# Patient Record
Sex: FEMALE | Race: WHITE | NOT HISPANIC OR LATINO
[De-identification: names, ages, dates, MRNs, and addresses within clinical notes are randomized per-mention and may not be internally consistent; named-entity substitution may affect disease eponyms.]

---

## 2018-06-22 ENCOUNTER — RX ONLY (RX ONLY)
Age: 33
End: 2018-06-22

## 2018-06-22 ENCOUNTER — WORRISOME GROWTH - SEE NOTE (OUTPATIENT)
Dept: URBAN - METROPOLITAN AREA CLINIC 36 | Facility: CLINIC | Age: 33
Setting detail: DERMATOLOGY
End: 2018-06-22

## 2018-06-22 PROCEDURE — 99213 OFFICE O/P EST LOW 20 MIN: CPT

## 2018-06-22 RX ORDER — TRIAMCINOLONE ACETONIDE 1 MG/G
1 APPLICATION CREAM TOPICAL BID
Qty: 30 | Refills: 0 | Status: DISCONTINUED
Start: 2018-06-22 | End: 2019-08-29

## 2018-08-03 ENCOUNTER — SKIN CANCER EXAM (OUTPATIENT)
Dept: URBAN - METROPOLITAN AREA CLINIC 36 | Facility: CLINIC | Age: 33
Setting detail: DERMATOLOGY
End: 2018-08-03

## 2018-08-03 PROCEDURE — 11301 SHAVE SKIN LESION 0.6-1.0 CM: CPT

## 2018-08-03 PROCEDURE — 99214 OFFICE O/P EST MOD 30 MIN: CPT

## 2018-08-03 PROCEDURE — 11306 SHAVE SKIN LESION 0.6-1.0 CM: CPT

## 2019-08-29 ENCOUNTER — SKIN CANCER EXAM (OUTPATIENT)
Dept: URBAN - METROPOLITAN AREA CLINIC 36 | Facility: CLINIC | Age: 34
Setting detail: DERMATOLOGY
End: 2019-08-29

## 2019-08-29 DIAGNOSIS — L85.3 XEROSIS CUTIS: ICD-10-CM

## 2019-08-29 PROCEDURE — 99214 OFFICE O/P EST MOD 30 MIN: CPT

## 2019-08-29 PROCEDURE — 11301 SHAVE SKIN LESION 0.6-1.0 CM: CPT

## 2019-09-04 ENCOUNTER — FACIAL- EST (OUTPATIENT)
Dept: URBAN - METROPOLITAN AREA CLINIC 36 | Facility: CLINIC | Age: 34
Setting detail: DERMATOLOGY
End: 2019-09-04

## 2019-09-12 ENCOUNTER — FOLLOW UP (OUTPATIENT)
Dept: URBAN - METROPOLITAN AREA CLINIC 36 | Facility: CLINIC | Age: 34
Setting detail: DERMATOLOGY
End: 2019-09-12

## 2019-09-12 DIAGNOSIS — Z41.9 ENCOUNTER FOR PROCEDURE FOR PURPOSES OTHER THAN REMEDYING HEALTH STATE, UNSPECIFIED: ICD-10-CM

## 2019-09-12 PROCEDURE — 99213 OFFICE O/P EST LOW 20 MIN: CPT

## 2019-09-12 PROCEDURE — 11301 SHAVE SKIN LESION 0.6-1.0 CM: CPT

## 2020-03-04 ENCOUNTER — SKIN CANCER EXAM (OUTPATIENT)
Dept: URBAN - METROPOLITAN AREA CLINIC 36 | Facility: CLINIC | Age: 35
Setting detail: DERMATOLOGY
End: 2020-03-04

## 2020-03-04 ENCOUNTER — RX ONLY (RX ONLY)
Age: 35
End: 2020-03-04

## 2020-03-04 PROBLEM — D18.01 HEMANGIOMA OF SKIN AND SUBCUTANEOUS TISSUE: Status: RESOLVED | Noted: 2020-03-04

## 2020-03-04 PROBLEM — D18.01 HEMANGIOMA OF SKIN AND SUBCUTANEOUS TISSUE: Status: ACTIVE | Noted: 2020-03-04

## 2020-03-04 PROCEDURE — OTHER DYSPORT VIAL #: OTHER

## 2020-03-04 PROCEDURE — 11400 EXC TR-EXT B9+MARG 0.5 CM<: CPT

## 2020-03-04 PROCEDURE — 99214 OFFICE O/P EST MOD 30 MIN: CPT

## 2020-03-04 PROCEDURE — OTHER DYSPORT - 2 AREA: OTHER

## 2020-03-04 PROCEDURE — 12031 INTMD RPR S/A/T/EXT 2.5 CM/<: CPT

## 2020-03-04 RX ORDER — TRETINOIN 0.5 MG/G
1 APPLICATION LOTION TOPICAL NIGHTLY
Qty: 45 | Refills: 3
Start: 2020-03-04

## 2020-03-11 ENCOUNTER — *BOTOX/DYSPORT (OUTPATIENT)
Dept: URBAN - METROPOLITAN AREA CLINIC 36 | Facility: CLINIC | Age: 35
Setting detail: DERMATOLOGY
End: 2020-03-11

## 2020-03-11 DIAGNOSIS — L73.2 HIDRADENITIS SUPPURATIVA: ICD-10-CM

## 2020-03-11 DIAGNOSIS — D22.5 MELANOCYTIC NEVI OF TRUNK: ICD-10-CM

## 2020-03-11 DIAGNOSIS — L85.8 OTHER SPECIFIED EPIDERMAL THICKENING: ICD-10-CM

## 2020-03-11 PROCEDURE — OTHER DYSPORT - TOUCH-UP: OTHER

## 2020-03-11 PROCEDURE — OTHER DYSPORT VIAL #: OTHER

## 2020-12-17 ENCOUNTER — SKIN CANCER EXAM (OUTPATIENT)
Dept: URBAN - METROPOLITAN AREA CLINIC 36 | Facility: CLINIC | Age: 35
Setting detail: DERMATOLOGY
End: 2020-12-17

## 2020-12-17 ENCOUNTER — RX ONLY (RX ONLY)
Age: 35
End: 2020-12-17

## 2020-12-17 PROBLEM — L71.0 PERIORAL DERMATITIS: Status: ACTIVE | Noted: 2020-12-17

## 2020-12-17 PROBLEM — D18.01 HEMANGIOMA OF SKIN AND SUBCUTANEOUS TISSUE: Status: ACTIVE | Noted: 2020-12-17

## 2020-12-17 PROBLEM — L71.0 PERIORAL DERMATITIS: Status: RESOLVED | Noted: 2020-12-17

## 2020-12-17 PROBLEM — D18.01 HEMANGIOMA OF SKIN AND SUBCUTANEOUS TISSUE: Status: RESOLVED | Noted: 2020-12-17

## 2020-12-17 PROCEDURE — 99214 OFFICE O/P EST MOD 30 MIN: CPT

## 2020-12-17 PROCEDURE — 11300 SHAVE SKIN LESION 0.5 CM/<: CPT

## 2020-12-17 RX ORDER — DOXYCYCLINE HYCLATE 20 MG/1
1 TABLET TABLET, FILM COATED ORAL BID
Qty: 60 | Refills: 2 | Status: DISCONTINUED
Start: 2020-12-17 | End: 2021-02-11

## 2021-02-11 ENCOUNTER — RX ONLY (RX ONLY)
Age: 36
End: 2021-02-11

## 2021-02-11 RX ORDER — DOXYCYCLINE HYCLATE 20 MG/1
1 TABLET TABLET, FILM COATED ORAL BID
Qty: 180 | Refills: 0
Start: 2021-02-11

## 2021-03-01 ENCOUNTER — ACNE/ROSACEA (OUTPATIENT)
Dept: URBAN - METROPOLITAN AREA CLINIC 36 | Facility: CLINIC | Age: 36
Setting detail: DERMATOLOGY
End: 2021-03-01

## 2021-03-01 DIAGNOSIS — L56.8 OTHER SPECIFIED ACUTE SKIN CHANGES DUE TO ULTRAVIOLET RADIATION: ICD-10-CM

## 2021-03-01 DIAGNOSIS — B35.1 TINEA UNGUIUM: ICD-10-CM

## 2021-03-01 PROCEDURE — 99214 OFFICE O/P EST MOD 30 MIN: CPT

## 2023-04-20 ENCOUNTER — APPOINTMENT (OUTPATIENT)
Dept: URBAN - METROPOLITAN AREA CLINIC 207 | Age: 38
Setting detail: DERMATOLOGY
End: 2023-04-20

## 2023-04-20 DIAGNOSIS — B36.0 PITYRIASIS VERSICOLOR: ICD-10-CM

## 2023-04-20 DIAGNOSIS — L70.0 ACNE VULGARIS: ICD-10-CM

## 2023-04-20 DIAGNOSIS — R21 RASH AND OTHER NONSPECIFIC SKIN ERUPTION: ICD-10-CM

## 2023-04-20 PROCEDURE — OTHER COUNSELING: OTHER

## 2023-04-20 PROCEDURE — OTHER PRESCRIPTION MEDICATION MANAGEMENT: OTHER

## 2023-04-20 PROCEDURE — OTHER MIPS QUALITY: OTHER

## 2023-04-20 PROCEDURE — OTHER PRESCRIPTION: OTHER

## 2023-04-20 PROCEDURE — 99214 OFFICE O/P EST MOD 30 MIN: CPT

## 2023-04-20 RX ORDER — KETOCONAZOLE 20 MG/G
CREAM TOPICAL BID
Qty: 60 | Refills: 0 | Status: ERX | COMMUNITY
Start: 2023-04-20

## 2023-04-20 RX ORDER — TAZAROTENE 0.45 MG/G
LOTION TOPICAL
Qty: 45 | Refills: 4 | Status: ERX | COMMUNITY
Start: 2023-04-20

## 2023-04-20 ASSESSMENT — LOCATION DETAILED DESCRIPTION DERM
LOCATION DETAILED: RIGHT INFRAMAMMARY CREASE (INNER QUADRANT)
LOCATION DETAILED: LEFT MEDIAL BREAST 8-9:00 REGION
LOCATION DETAILED: LEFT CENTRAL MALAR CHEEK
LOCATION DETAILED: LEFT RIB CAGE
LOCATION DETAILED: LEFT LATERAL ABDOMEN
LOCATION DETAILED: RIGHT INFERIOR UPPER BACK
LOCATION DETAILED: RIGHT RIB CAGE

## 2023-04-20 ASSESSMENT — LOCATION SIMPLE DESCRIPTION DERM
LOCATION SIMPLE: LEFT CHEEK
LOCATION SIMPLE: ABDOMEN
LOCATION SIMPLE: RIGHT BREAST
LOCATION SIMPLE: LEFT BREAST
LOCATION SIMPLE: RIGHT UPPER BACK

## 2023-04-20 ASSESSMENT — LOCATION ZONE DERM
LOCATION ZONE: TRUNK
LOCATION ZONE: FACE

## 2023-04-20 NOTE — PROCEDURE: COUNSELING
Birth Control Pills Counseling: Birth Control Pill Counseling: I discussed with the patient the potential side effects of OCPs including but not limited to increased risk of stroke, heart attack, thrombophlebitis, deep venous thrombosis, hepatic adenomas, breast changes, GI upset, headaches, changes in libido,  and depression. There is a slight increase risk of breast cancer by 7% overall,( 20-30% higher while taking BCPs, and returning to 0% increased risk within 5 years.) ref: [facility] breast cancer site.\\n\\n Advantages of BCPs include regulating periods and menstrual flow, can make periods lighter, and decrease the discomfort of menstrual cramps, decrease risk of osteoporosis, anemia, vaginal dryness, and body hair.  They can decrease the risk of ovarian cancer by 27% and endometrial cancer by 50%. The patient verbalized understanding of the proper use and possible adverse effects of OCPs. All of the patient's questions and concerns were addressed.\\n\\n\\nBcp vs antibiotics:\\Alexa brand of birth control pills and the Nuvaring improve acne after 4 months as effectively as antibiotics per studies in [facility]. In this age of multi drug resistant bacteria, dermatologists are encouraged to discontinue antibiotics after 3-4 months.  Spironolactone, a hormone receptor blocker is another alternative to both therapies,  There are both medical advantages and side effects of birth control pills that need to be evaluated with your health care provider. Contraindications including women who smoke, with histories of blood clots , migraines, estrogen positive breast cancer or other medical conditions. Age greater than 35 increases risk of all the above risks Birth Control Pills Counseling: Birth Control Pill Counseling: I discussed with the patient the potential side effects of OCPs including but not limited to increased risk of stroke, heart attack, thrombophlebitis, deep venous thrombosis, hepatic adenomas, breast changes, GI upset, headaches, changes in libido,  and depression. There is a slight increase risk of breast cancer by 7% overall,( 20-30% higher while taking BCPs, and returning to 0% increased risk within 5 years.) ref: [facility]na Gove[facility] breast cancer site.\\n\\n Advantages of BCPs include regulating periods and menstrual flow, can make periods lighter, and decrease the discomfort of menstrual cramps, decrease risk of osteoporosis, anemia, vaginal dryness, and body hair.  They can decrease the risk of ovarian cancer by 27% and endometrial cancer by 50%. The patient verbalized understanding of the proper use and possible adverse effects of OCPs. All of the patient's questions and concerns were addressed.\\n\\n\\nBcp vs antibiotics:\\Alexa brand of birth control pills and the Nuvaring improve acne after 4 months as effectively as antibiotics per studies in [facility]. In this age of multi drug resistant bacteria, dermatologists are encouraged to discontinue antibiotics after 3-4 months.  Spironolactone, a hormone receptor blocker is another alternative to both therapies,  There are both medical advantages and side effects of birth control pills that need to be evaluated with your health care provider. Contraindications including women who smoke, with histories of blood clots , migraines, estrogen positive breast cancer or other medical conditions. Age greater than 35 increases risk of all the above risks

## 2023-04-20 NOTE — PROCEDURE: PRESCRIPTION MEDICATION MANAGEMENT
Plan: Well controlled on topical\\nRefills sent/good for 1 year\\n\\nRx: Arazlo lotion - apply pea sized amount on face nightly as tolerable \\n\\nContinue using noncomedogenic products such as CeraVe or Cetaphil Gentle\\nCombat dryness from retinoid by applying gentle moisturizer before and/or after applications\\nSunscreen SPF 30+ daily\\nDiscontinue if become pregnant

## 2023-04-20 NOTE — PROCEDURE: PRESCRIPTION MEDICATION MANAGEMENT
Plan: TV vs. CO\\nDiscussed nature of diagnoses in detail\\n\\nPatient advised if rash is unresponsive to topical, suspect CO. Dermnet print out on CO provided\\nInformed patient CO is benign and not contagious. Advised duration of rash is typically 3 months and can continue to spread to different areas of body\\n\\nDiscussed procedure of bx'ing if not resolved at upcoming SCE in July\\nRx: Ketoconazole 2% cream - apply to affected areas twice a day for 2 weeks as needed \\n\\nInformed patient PIH may occur secondarily to rash and will resolve in time\\nSunscreen SPF 30+ daily and sun protective clothing Plan: TV vs. RI\\nDiscussed nature of diagnoses in detail\\n\\nPatient advised if rash is unresponsive to topical, suspect RI. Dermnet print out on RI provided\\nInformed patient RI is benign and not contagious. Advised duration of rash is typically 3 months and can continue to spread to different areas of body\\n\\nDiscussed procedure of bx'ing if not resolved at upcoming SCE in July\\nRx: Ketoconazole 2% cream - apply to affected areas twice a day for 2 weeks as needed \\n\\nInformed patient PIH may occur secondarily to rash and will resolve in time\\nSunscreen SPF 30+ daily and sun protective clothing

## 2023-07-05 ENCOUNTER — APPOINTMENT (OUTPATIENT)
Dept: URBAN - METROPOLITAN AREA CLINIC 207 | Age: 38
Setting detail: DERMATOLOGY
End: 2023-07-05
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
## 2023-07-05 DIAGNOSIS — L57.8 OTHER SKIN CHANGES DUE TO CHRONIC EXPOSURE TO NONIONIZING RADIATION: ICD-10-CM

## 2023-07-05 DIAGNOSIS — Z85.820 PERSONAL HISTORY OF MALIGNANT MELANOMA OF SKIN: ICD-10-CM

## 2023-07-05 DIAGNOSIS — Z41.9 ENCOUNTER FOR PROCEDURE FOR PURPOSES OTHER THAN REMEDYING HEALTH STATE, UNSPECIFIED: ICD-10-CM

## 2023-07-05 DIAGNOSIS — D22 MELANOCYTIC NEVI: ICD-10-CM

## 2023-07-05 DIAGNOSIS — L82.1 OTHER SEBORRHEIC KERATOSIS: ICD-10-CM

## 2023-07-05 DIAGNOSIS — D18.0 HEMANGIOMA: ICD-10-CM
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
## 2023-07-05 PROBLEM — D18.01 HEMANGIOMA OF SKIN AND SUBCUTANEOUS TISSUE: Status: ACTIVE | Noted: 2023-07-05

## 2023-07-05 PROBLEM — D22.9 MELANOCYTIC NEVI, UNSPECIFIED: Status: ACTIVE | Noted: 2023-07-05

## 2023-07-05 PROCEDURE — OTHER COUNSELING: OTHER

## 2023-07-05 PROCEDURE — OTHER INVENTORY: OTHER

## 2023-07-05 PROCEDURE — 99213 OFFICE O/P EST LOW 20 MIN: CPT

## 2023-07-05 PROCEDURE — OTHER PATIENT SPECIFIC COUNSELING: OTHER

## 2023-07-05 PROCEDURE — OTHER MIPS QUALITY: OTHER

## 2023-07-05 PROCEDURE — OTHER DYSPORT: OTHER

## 2023-07-05 ASSESSMENT — LOCATION DETAILED DESCRIPTION DERM
LOCATION DETAILED: RIGHT SUPERIOR LATERAL MIDBACK
LOCATION DETAILED: RIGHT SUPERIOR UPPER BACK
LOCATION DETAILED: UPPER STERNUM
LOCATION DETAILED: LEFT LATERAL SUPERIOR CHEST

## 2023-07-05 ASSESSMENT — LOCATION SIMPLE DESCRIPTION DERM
LOCATION SIMPLE: RIGHT LOWER BACK
LOCATION SIMPLE: CHEST
LOCATION SIMPLE: RIGHT UPPER BACK

## 2023-07-05 ASSESSMENT — LOCATION ZONE DERM: LOCATION ZONE: TRUNK

## 2023-07-05 NOTE — PROCEDURE: COUNSELING
Detail Level: Generalized
Quality 137: Melanoma: Continuity Of Care - Recall System: Patient information entered into a recall system that includes: target date for the next exam specified AND a process to follow up with patients regarding missed or unscheduled appointments
Detail Level: Zone
When Should The Patient Follow-Up For Their Next Full-Body Skin Exam?: 1 Year

## 2023-07-05 NOTE — PROCEDURE: MIPS QUALITY
Quality 226: Preventive Care And Screening: Tobacco Use: Screening And Cessation Intervention: Patient screened for tobacco use and is an ex/non-smoker
Quality 138: Melanoma: Coordination Of Care: Treatment plan not communicated, reason not otherwise specified.
Detail Level: Detailed
Quality 137: Melanoma: Continuity Of Care - Recall System: Patient information entered into a recall system that includes: target date for the next exam specified AND a process to follow up with patients regarding missed or unscheduled appointments

## 2023-07-05 NOTE — PROCEDURE: DYSPORT
Price (Use Numbers Only, No Special Characters Or $): 257 Price (Use Numbers Only, No Special Characters Or $): 771

## 2023-07-12 ENCOUNTER — APPOINTMENT (OUTPATIENT)
Dept: URBAN - METROPOLITAN AREA CLINIC 207 | Age: 38
Setting detail: DERMATOLOGY
End: 2023-07-12

## 2023-07-12 DIAGNOSIS — Z41.9 ENCOUNTER FOR PROCEDURE FOR PURPOSES OTHER THAN REMEDYING HEALTH STATE, UNSPECIFIED: ICD-10-CM

## 2023-07-12 PROCEDURE — OTHER INVENTORY: OTHER

## 2023-07-12 PROCEDURE — OTHER DYSPORT: OTHER

## 2023-07-12 NOTE — PROCEDURE: DYSPORT
Map Statement: Please see attached map for locations and injection amounts. 0.1=
Consent: Written consent obtained. Risks include but not limited to lid/brow ptosis, bruising, swelling, diplopia, temporary effect, incomplete chemical denervation.
Price (Use Numbers Only, No Special Characters Or $): 000
Total Units: 0
Post-Care Instructions: Patient instructed to not lie down for 4 hours and limit physical activity for 24 hours.
Show Inventory Tab: Hide
Detail Level: Detailed

## 2024-10-02 ENCOUNTER — APPOINTMENT (OUTPATIENT)
Dept: URBAN - METROPOLITAN AREA CLINIC 207 | Age: 39
Setting detail: DERMATOLOGY
End: 2024-10-02

## 2024-10-02 DIAGNOSIS — D22 MELANOCYTIC NEVI: ICD-10-CM

## 2024-10-02 DIAGNOSIS — Z85.820 PERSONAL HISTORY OF MALIGNANT MELANOMA OF SKIN: ICD-10-CM

## 2024-10-02 DIAGNOSIS — D18.0 HEMANGIOMA: ICD-10-CM

## 2024-10-02 DIAGNOSIS — L82.1 OTHER SEBORRHEIC KERATOSIS: ICD-10-CM
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
## 2024-10-02 DIAGNOSIS — L57.8 OTHER SKIN CHANGES DUE TO CHRONIC EXPOSURE TO NONIONIZING RADIATION: ICD-10-CM

## 2024-10-02 DIAGNOSIS — Z41.9 ENCOUNTER FOR PROCEDURE FOR PURPOSES OTHER THAN REMEDYING HEALTH STATE, UNSPECIFIED: ICD-10-CM

## 2024-10-02 PROBLEM — D22.9 MELANOCYTIC NEVI, UNSPECIFIED: Status: ACTIVE | Noted: 2024-10-02

## 2024-10-02 PROBLEM — D18.01 HEMANGIOMA OF SKIN AND SUBCUTANEOUS TISSUE: Status: ACTIVE | Noted: 2024-10-02
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
## 2024-10-02 PROCEDURE — OTHER INVENTORY: OTHER

## 2024-10-02 PROCEDURE — 99213 OFFICE O/P EST LOW 20 MIN: CPT

## 2024-10-02 PROCEDURE — OTHER DYSPORT: OTHER

## 2024-10-02 PROCEDURE — OTHER MIPS QUALITY: OTHER

## 2024-10-02 PROCEDURE — OTHER COUNSELING: OTHER

## 2024-10-02 ASSESSMENT — LOCATION SIMPLE DESCRIPTION DERM
LOCATION SIMPLE: RIGHT LOWER BACK
LOCATION SIMPLE: CHEST
LOCATION SIMPLE: RIGHT UPPER BACK

## 2024-10-02 ASSESSMENT — LOCATION DETAILED DESCRIPTION DERM
LOCATION DETAILED: RIGHT SUPERIOR UPPER BACK
LOCATION DETAILED: LEFT LATERAL SUPERIOR CHEST
LOCATION DETAILED: RIGHT SUPERIOR LATERAL MIDBACK
LOCATION DETAILED: UPPER STERNUM

## 2024-10-02 ASSESSMENT — LOCATION ZONE DERM: LOCATION ZONE: TRUNK

## 2024-10-02 NOTE — PROCEDURE: DYSPORT
Show Inventory Tab: Hide
Price (Use Numbers Only, No Special Characters Or $): 377
Total Units: 0
Detail Level: Detailed
Post-Care Instructions: Patient instructed to not lie down for 4 hours and limit physical activity for 24 hours. Botox/Dysport Aftercare Instructions\\n\\nDo NOT have a facial, rub or massage the treated area for 24 hours after your treatment \\n Do NOT do strenuous exercise for four hours after treatment. This will minimize the risk of bruising after treatment \\nRarely some patients, experience a mild headache. You may take Tylenol to help with this for pain relief\\nSmall pinpricks or pinpoint bleeding is normal. Tiny bumps or marks will go away within a few hours after your treatment. There is a small risk of bruising, which may last a week. If this occurs, it can easily be covered up with makeup. You may also call the office to have your bruise treated with a laser to resolve it faster (darker skin types excluded from laser).  \\n After Botox/Dysport is placed into the targeted muscles, the weakening effect gradually begins anywhere from 3-7 days and is not complete for two weeks. Therefore, optimal results are not seen for at least two weeks , 1 week for Dysport.\\nSome patients find that once wrinkles have diminished in one area, the wrinkles in other areas of the face may become more bothersome. If you would like to consider treating another areas please call our office for an appointment to discuss with your doctor. \\nBotox/Dysport will last approximately 3 months.\\nPatient understands rare risks include bruising, bleeding, headache, swelling and eyebrow/eyelid drooping.\\n\\n***You may return for a courtesy touch-up in two weeks. AFTER two weeks you will have to pay a fee for your touch-up***\\n\\nPLEASE CANCEL YOUR TOUCH-UP APPOINTMENT IF NOT NEEDED
Map Statement: Please see attached map for locations and injection amounts.
Consent: Written consent obtained. Risks include but not limited to lid/brow ptosis, bruising, swelling, diplopia, temporary effect, incomplete chemical denervation.

## 2024-10-02 NOTE — PROCEDURE: COUNSELING
Quality 137: Melanoma: Continuity Of Care - Recall System: Patient information entered into a recall system that includes: target date for the next exam specified AND a process to follow up with patients regarding missed or unscheduled appointments
Detail Level: Zone
When Should The Patient Follow-Up For Their Next Full-Body Skin Exam?: 1 Year
Detail Level: Generalized

## 2024-10-07 ENCOUNTER — RX ONLY (RX ONLY)
Age: 39
End: 2024-10-07

## 2024-10-07 RX ORDER — TAZAROTENE 0.45 MG/G
LOTION TOPICAL
Qty: 45 | Refills: 4 | Status: ERX